# Patient Record
Sex: MALE | Race: WHITE | URBAN - METROPOLITAN AREA
[De-identification: names, ages, dates, MRNs, and addresses within clinical notes are randomized per-mention and may not be internally consistent; named-entity substitution may affect disease eponyms.]

---

## 2019-01-22 ENCOUNTER — EMERGENCY (EMERGENCY)
Facility: HOSPITAL | Age: 29
LOS: 0 days | Discharge: HOME | End: 2019-01-22
Attending: EMERGENCY MEDICINE | Admitting: EMERGENCY MEDICINE

## 2019-01-22 VITALS
SYSTOLIC BLOOD PRESSURE: 126 MMHG | TEMPERATURE: 99 F | HEART RATE: 86 BPM | OXYGEN SATURATION: 98 % | WEIGHT: 139.99 LBS | DIASTOLIC BLOOD PRESSURE: 65 MMHG | RESPIRATION RATE: 20 BRPM

## 2019-01-22 VITALS
TEMPERATURE: 99 F | OXYGEN SATURATION: 98 % | RESPIRATION RATE: 18 BRPM | DIASTOLIC BLOOD PRESSURE: 76 MMHG | HEART RATE: 78 BPM | SYSTOLIC BLOOD PRESSURE: 121 MMHG

## 2019-01-22 DIAGNOSIS — R55 SYNCOPE AND COLLAPSE: ICD-10-CM

## 2019-01-22 NOTE — ED ADULT NURSE NOTE - OBJECTIVE STATEMENT
Patient presents with syncopal episode after smoking marijuana. Patient denies hitting head or taking any blood thinners. No other injuries noted. ROM + in all extremities. No PMH.

## 2019-01-22 NOTE — ED PROVIDER NOTE - NSFOLLOWUPINSTRUCTIONS_ED_ALL_ED_FT
Syncope    Syncope is when you temporarily lose consciousness, also called fainting or passing out. It is caused by a sudden decrease in blood flow to the brain. Even though most causes of syncope are not dangerous, syncope can be a sign of a serious medical problem. Signs that you may be about to faint include feeling dizzy, lightheaded, nauseas, visual changes, cold/clammy skin. Do not drive, use machinery, or play sports until your health care provider says it is okay.    SEEK IMMEDIATE MEDICAL CARE IF YOU HAVE THE FOLLOWING SYMPTOMS: severe headache, pain in your chest/abdomen/back, bleeding from your mouth or rectum, palpitations, shortness of breath, pain with breathing, seizure, confusion, trouble walking.

## 2019-01-22 NOTE — ED PROVIDER NOTE - PHYSICAL EXAMINATION
CONSTITUTIONAL: Well-appearing; well-nourished; in no apparent distress.   EYES: PERRL; EOM intact.   ENT: normal nose; no rhinorrhea; normal pharynx with no tonsillar hypertrophy.   CARDIOVASCULAR: Normal S1, S2; no murmurs, rubs, or gallops.   RESPIRATORY: Normal chest excursion with respiration; breath sounds clear and equal bilaterally; no wheezes, rhonchi, or rales.  GI/: Normal bowel sounds; non-distended; non-tender; no palpable organomegaly.   MS: No evidence of trauma or deformity.   SKIN: healing abrasions to left forearm and elbow. no bleeding and drainage. + granulating tissue. no surrounding erythema/swelling and tenderness.   NEURO/PSYCH: A & O x 4; CN II-XII grossly unremarkable. ambulate with normal and steady gait.

## 2019-01-22 NOTE — ED ADULT TRIAGE NOTE - CHIEF COMPLAINT QUOTE
Pt came c/o syncope episode at about 8 PM today, pt stated he felt lightheaded and sweaty and passed out on the wooden floor in the house, denies head injury, denies blood thinners.
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
## 2019-01-22 NOTE — ED PROVIDER NOTE - ATTENDING CONTRIBUTION TO CARE
Healthy 27 yo M here for assessment sp syncopal episode. Patient has been in [facility], smoked weed which he often does, about 20 minutes later began to feel lightheaded, warm, nauseous, sweaty. He was sitting but felt like he was going to pass out, stood to walk to get a family member and collapsed on the floor. Unsure how long he was on the floor but feels like it was seconds.    He currently has no complaints. No CP, SOB, nausea, vomiting, diarrhea. No LE swelling, history of DVT, PE.     VS normal, EKG with normal intervals, no arrhythmia, FSG normal. Patient has non focal neuro exam, RRR, clear lungs, no external signs of trauma.    Sx likely vasovagal, not suggestive of cardiac arrhythmia, CVA. No evidence of trauma from fall-- will dc home with close follow up, return precuations.

## 2019-01-22 NOTE — ED ADULT NURSE NOTE - NSIMPLEMENTINTERV_GEN_ALL_ED
Implemented All Universal Safety Interventions:  [place] to call system. Call bell, personal items and telephone within reach. Instruct patient to call for assistance. Room bathroom lighting operational. Non-slip footwear when patient is off stretcher. Physically safe environment: no spills, clutter or unnecessary equipment. Stretcher in lowest position, wheels locked, appropriate side rails in place.

## 2019-01-22 NOTE — ED PROVIDER NOTE - NS ED ROS FT
Constitutional: no fever, chills, no recent weight loss, change in appetite or malaise  Eyes: no redness/discharge/pain/vision changes  ENT: no rhinorrhea/ear pain/sore throat  Cardiac: + syncope. No chest pain, SOB or edema.  Respiratory: No cough or respiratory distress  GI: No nausea, vomiting, diarrhea or abdominal pain.  : No dysuria, frequency, urgency or hematuria  MS: no pain to back or extremities, no loss of ROM, no weakness  Neuro: No headache or weakness. No LOC.  Skin: + abrasion  Endocrine: No history of thyroid disease or diabetes.  Except as documented in the HPI, all other systems are negative.

## 2019-01-22 NOTE — ED PROVIDER NOTE - CARE PROVIDER_API CALL
Travis Davis), Cardiovascular Disease; Interventional Cardiology  [address] [place] [address]  Phone: [phone]  Fax: [phone]

## 2021-03-24 ENCOUNTER — EMERGENCY (EMERGENCY)
Facility: HOSPITAL | Age: 31
LOS: 0 days | Discharge: HOME | End: 2021-03-25
Attending: EMERGENCY MEDICINE | Admitting: EMERGENCY MEDICINE
Payer: MEDICAID

## 2021-03-24 DIAGNOSIS — T50.901A POISONING BY UNSPECIFIED DRUGS, MEDICAMENTS AND BIOLOGICAL SUBSTANCES, ACCIDENTAL (UNINTENTIONAL), INITIAL ENCOUNTER: ICD-10-CM

## 2021-03-24 PROCEDURE — 99284 EMERGENCY DEPT VISIT MOD MDM: CPT

## 2021-03-24 PROCEDURE — 99053 MED SERV 10PM-8AM 24 HR FAC: CPT

## 2021-03-25 VITALS
TEMPERATURE: 97 F | DIASTOLIC BLOOD PRESSURE: 83 MMHG | SYSTOLIC BLOOD PRESSURE: 125 MMHG | OXYGEN SATURATION: 99 % | RESPIRATION RATE: 16 BRPM | HEART RATE: 88 BPM

## 2021-03-25 VITALS
HEART RATE: 76 BPM | SYSTOLIC BLOOD PRESSURE: 106 MMHG | DIASTOLIC BLOOD PRESSURE: 63 MMHG | TEMPERATURE: 97 F | RESPIRATION RATE: 18 BRPM | OXYGEN SATURATION: 99 %

## 2021-03-25 PROCEDURE — 71045 X-RAY EXAM CHEST 1 VIEW: CPT | Mod: 26

## 2021-03-25 PROCEDURE — 93010 ELECTROCARDIOGRAM REPORT: CPT

## 2021-03-25 RX ORDER — ONDANSETRON 8 MG/1
4 TABLET, FILM COATED ORAL ONCE
Refills: 0 | Status: COMPLETED | OUTPATIENT
Start: 2021-03-25 | End: 2021-03-25

## 2021-03-25 RX ADMIN — ONDANSETRON 4 MILLIGRAM(S): 8 TABLET, FILM COATED ORAL at 03:40

## 2021-03-25 NOTE — ED PROVIDER NOTE - PATIENT PORTAL LINK FT
You can access the FollowMyHealth Patient Portal offered by [facility] by registering at the following website: [email or web address]. By joining [facility]’s FollowMyHealth portal, you will also be able to view your health information using other applications (apps) compatible with our system.
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
## 2021-03-25 NOTE — ED PROVIDER NOTE - NSFOLLOWUPCLINICS_GEN_ALL_ED_FT
[facility]  Detox Mgmt  [address] [place] [address]  Phone: [phone]  Fax:   Follow Up Time: 4-6 Days

## 2021-03-25 NOTE — ED ADULT NURSE NOTE - NSIMPLEMENTINTERV_GEN_ALL_ED
Implemented All Universal Safety Interventions:  [place] to call system. Call bell, personal items and telephone within reach. Instruct patient to call for assistance. Room bathroom lighting operational. Non-slip footwear when patient is off stretcher. Physically safe environment: no spills, clutter or unnecessary equipment. Stretcher in lowest position, wheels locked, appropriate side rails in place.

## 2021-03-25 NOTE — ED PROVIDER NOTE - PHYSICAL EXAMINATION
Vital Signs: I have reviewed the initial vital signs.  Constitutional: well-nourished, appears stated age, no acute distress.  HEENT: Airway patent, moist MM, no erythema/swelling/deformity of oral structures. EOMI, PERRLA.  CV: regular rate, regular rhythm, well-perfused extremities, 2+ b/l DP and radial pulses equal.  Lungs: BCTA, no increased WOB.  ABD: soft, NTND, no guarding or rebound, no pulsatile mass, no hernias.   MSK: Neck supple, nontender, nl ROM, no stepoff. Chest nontender. Back nontender in TLS spine or to b/l bony structures or flanks. Ext nontender, nl rom, no deformity.   INTEG: Skin cool, dry, no rash.  NEURO: A&Ox3, moving all extremities, normal speech  PSYCH: Calm, cooperative, normal affect and interaction.
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
## 2021-03-31 ENCOUNTER — APPOINTMENT (OUTPATIENT)
Dept: OPHTHALMOLOGY | Facility: CLINIC | Age: 31
End: 2021-03-31

## 2021-03-31 ENCOUNTER — OUTPATIENT (OUTPATIENT)
Dept: OUTPATIENT SERVICES | Facility: HOSPITAL | Age: 31
LOS: 1 days | Discharge: HOME | End: 2021-03-31
Payer: MEDICAID

## 2021-03-31 PROBLEM — Z00.00 ENCOUNTER FOR PREVENTIVE HEALTH EXAMINATION: Status: ACTIVE | Noted: 2021-03-31

## 2021-03-31 PROCEDURE — 92002 INTRM OPH EXAM NEW PATIENT: CPT

## 2021-04-02 ENCOUNTER — APPOINTMENT (OUTPATIENT)
Dept: OPHTHALMOLOGY | Facility: CLINIC | Age: 31
End: 2021-04-02

## 2021-04-02 ENCOUNTER — OUTPATIENT (OUTPATIENT)
Dept: OUTPATIENT SERVICES | Facility: HOSPITAL | Age: 31
LOS: 1 days | Discharge: HOME | End: 2021-04-02
Payer: MEDICAID

## 2021-04-02 PROCEDURE — 92014 COMPRE OPH EXAM EST PT 1/>: CPT

## 2021-05-04 ENCOUNTER — APPOINTMENT (OUTPATIENT)
Dept: OPHTHALMOLOGY | Facility: CLINIC | Age: 31
End: 2021-05-04

## 2021-12-11 NOTE — ED PROVIDER NOTE - NSFOLLOWUPINSTRUCTIONS_ED_ALL_ED_FT
Substance Use Disorder  Substance use disorder happens when a person's repeated use of drugs or alcohol interferes with his or her ability to be productive. This disorder can cause problems with your mental and physical health. It can affect your ability to have healthy relationships, and it can keep you from being able to meet your responsibilities at work, home, or school. It can also lead to addiction.    The most commonly abused substances include:    Alcohol.  Tobacco.  Marijuana.  Stimulants, such as cocaine and methamphetamine.  Hallucinogens, such as LSD and PCP.  Opioids, such as some prescription pain medicines and heroin.    What are the causes?  This condition may develop due to social, psychological, or physical reasons. Causes include:    Stress.  Abuse.  Peer pressure.  Anxiety.  Depression.    What increases the risk?  This condition is more likely to develop in people who:    Are stressed.  Have been abused.  Have a mental health disorder, such as depression.  Are born with certain genes.    What are the signs or symptoms?  Symptoms of this condition include:    Using the substance for longer periods of time or at a higher dosage than what is normal or intended.  Having a lasting desire to use the substance.  Being unable to slow down or stop your use of the substance.  Spending an abnormal amount of time seeking the substance, using the substance, or recovering from using the substance.  Craving the substance.  Substance use that:    Interferes with your work, school, or home life.  Interferes with your personal and social relationships.  Makes you give up activities that you once enjoyed or found important.    Using the substance even though:    You know it is dangerous or bad for your health.  You know it is causing problems in your life.    Needing more and more of the substance to get the same effect (developing tolerance).  Experiencing physical symptoms if you do not use the substance (withdrawal).  Using the substance to avoid withdrawal.    How is this diagnosed?  This condition may be diagnosed based on:    Your history of substance use.  The way in which substance abuse affects your life.  Having at least two symptoms of substance use disorder within a 12-month period.    Your health care provider may also test your blood and urine for alcohol and drugs.    How is this treated?  ImageThis condition may be treated by:    Stopping substance use safely. This may require taking medicines and being closely observed for several days.  Taking part in group and individual counseling from mental health providers who help people with substance use disorder.  Staying at a residential treatment center for several days or weeks.  Attending daily counseling sessions at a treatment center.  Taking medicine as told by your health care provider:    To ease symptoms and prevent complications during withdrawal.  To treat other mental health issues, such as depression or anxiety.  To block cravings by causing the same effects as the substance.  To block the effects of the substance or replace good sensations with unpleasant ones.    Going to a support group to share your experience with others who are going through the same thing. These groups are an important part of long-term recovery for many people. They include 12-step programs like Alcoholics Anonymous and Narcotics Anonymous.    Recovery can be a long process. Many people who undergo treatment start using the substance again after stopping. This is called a relapse. If you have a relapse, that does not mean that treatment will not work.    Follow these instructions at home:  Take over-the-counter and prescription medicines only as told by your health care provider.  Do not use any drugs or alcohol.  Keep all follow-up visits as told by your health care provider. This is important. This includes continuing to work with therapists, health care providers, and support groups.  Contact a health care provider if:  You cannot take your medicines as told.  Your symptoms get worse.  You have trouble resisting the urge to use drugs or alcohol.  Get help right away if:  You have serious thoughts about hurting yourself or someone else.  You have a relapse.  This information is not intended to replace advice given to you by your health care provider. Make sure you discuss any questions you have with your health care provider.    Please check this website for help finding local Buprenorphine (Suboxone) providers or to find out if your PMD can prescribe Buprenorphine (Suboxone).  [email or web address]/medication-assisted-treatment/practitioner-program-data/treatment-practitioner- No

## 2022-12-05 NOTE — ED ADULT NURSE NOTE - IN THE PAST 12 MONTHS HAVE YOU USED DRUGS OTHER THAN THOSE REQUIRED FOR MEDICAL REASON?
Patient presents for palliative foot care  No acute disorder noted  Pedal pulses are palpable  Treatment consisted of nail trimming  Yes

## 2023-04-17 ENCOUNTER — EMERGENCY (EMERGENCY)
Facility: HOSPITAL | Age: 33
LOS: 0 days | Discharge: HOME | End: 2023-04-17
Attending: EMERGENCY MEDICINE
Payer: COMMERCIAL

## 2023-04-17 VITALS
TEMPERATURE: 99 F | HEART RATE: 88 BPM | SYSTOLIC BLOOD PRESSURE: 125 MMHG | RESPIRATION RATE: 19 BRPM | OXYGEN SATURATION: 99 % | DIASTOLIC BLOOD PRESSURE: 77 MMHG

## 2023-04-17 DIAGNOSIS — M54.2 CERVICALGIA: ICD-10-CM
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
## 2023-04-17 PROCEDURE — 72125 CT NECK SPINE W/O DYE: CPT | Mod: MA

## 2023-04-17 PROCEDURE — 99284 EMERGENCY DEPT VISIT MOD MDM: CPT

## 2023-04-17 PROCEDURE — 72125 CT NECK SPINE W/O DYE: CPT | Mod: 26,MA

## 2023-04-17 PROCEDURE — 99284 EMERGENCY DEPT VISIT MOD MDM: CPT | Mod: 25

## 2023-04-17 RX ORDER — METHOCARBAMOL 500 MG/1
2 TABLET, FILM COATED ORAL
Qty: 30 | Refills: 0
Start: 2023-04-17 | End: 2023-04-21

## 2023-04-17 RX ORDER — ACETAMINOPHEN 500 MG
975 TABLET ORAL ONCE
Refills: 0 | Status: COMPLETED | OUTPATIENT
Start: 2023-04-17 | End: 2023-04-17

## 2023-04-17 RX ADMIN — Medication 975 MILLIGRAM(S): at 19:13

## 2023-04-17 NOTE — ED PROVIDER NOTE - NSFOLLOWUPINSTRUCTIONS_ED_ALL_ED_FT
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
## 2023-04-17 NOTE — ED PROVIDER NOTE - PATIENT PORTAL LINK FT
You can access the FollowMyHealth Patient Portal offered by [facility] by registering at the following website: [email or web address]. By joining [facility]’s FollowMyHealth portal, you will also be able to view your health information using other applications (apps) compatible with our system.
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
## 2023-05-08 NOTE — ED PROVIDER NOTE - MEDICAL DECISION MAKING DETAILS
Nutrition Progress Note  (Physician Action Needed)    Physician- please edit note, check the appropriate diagnosis from list below and indicate whether you agree with the plan of care    The registered dietitian has identified that this patient meets criteria for malnutrition.    Nutrition Diagnosis:   Nutrition Diagnosis: Malnutrition  Malnutrition in the context of chronic illness: Severe  Related to: Alcohol/substance abuse, Decreased intake   Malnutrition chronic; severe: Weight loss of >10%/6 months, Energy intake of </ 75% estimated energy requirement for >/equal 1 month    Intervention:  Intervention: Meals and snacks, Nutrition education, Nutrition supplement therapy   Meals & snacks: Encourage po intakes, with protein at each meal. Patient would like diet advanced.  Nutrition supplement therapy: Will provide one strawberry Ensure Plus HP TID with meals.  Nutrition education: Reviewed Nutrition Tips for Home and added to AVS.    Physician Documentation  Based on above, patient meets criteria for:    [x]  Severe Protein Calorie Malnutrition  []  Moderate Protein Calorie Malnutrition  []  Mild Protein Calorie Malnutrition  [] Unable to determine   [] Other:     * Please add the appropriate diagnosis to the patient's Problem List and subsequent Progress Notes.     Plan of care:   [x]  Agree with nutrition assessment and plan of care as stated above.    [] Agree with nutrition assessment and plan of care, with exception. Patient meets criteria for diagnosis except ________.    [] Disagree with nutrition assessment and plan of care because_________.         Syncope likely vasovagal, possibly 2/2 marijuana, no evidence of arrhythmia, CVA. Strict return precuations given